# Patient Record
Sex: FEMALE
[De-identification: names, ages, dates, MRNs, and addresses within clinical notes are randomized per-mention and may not be internally consistent; named-entity substitution may affect disease eponyms.]

---

## 2021-02-01 ENCOUNTER — NURSE TRIAGE (OUTPATIENT)
Dept: OTHER | Facility: CLINIC | Age: 76
End: 2021-02-01

## 2021-02-01 NOTE — TELEPHONE ENCOUNTER
Reason for Disposition   [1] MILD swelling (puffiness) of both hands AND [2] new onset or worsening  (Exception: caused by hot weather or normal pregnancy swelling)    Answer Assessment - Initial Assessment Questions  1. ONSET: \"When did the swelling start? \" (e.g., minutes, hours, days)      About 15 min ago    2. LOCATION: \"What part of the hand is swollen? \"  \"Are both hands swollen or just one hand? \"      Pt states that her right index finger is swollen    3. SEVERITY: \"How bad is the swelling? \" (e.g., localized; mild, moderate, severe)    - BALL OR LUMP: small ball or lump    - LOCALIZED: puffy or swollen area or patch of skin    - JOINT SWELLING: swelling of a joint    - MILD: puffiness or mild swelling of fingers or hand    - MODERATE: fingers and hand are swollen    - SEVERE: swelling of entire hand and up into forearm      Mild     4. REDNESS: \"Does the swelling look red or infected? \"      Denies redness    5. PAIN: \"Is the swelling painful to touch? \" If so, ask: \"How painful is it? \"   (Scale 1-10; mild, moderate or severe)      Denies pain    6. FEVER: \"Do you have a fever? \" If so, ask: \"What is it, how was it measured, and when did it start? \"       Denies fever    7. CAUSE: \"What do you think is causing the hand swelling? \" (e.g., heat, insect bite, pregnancy, recent injury)      Pt is not sure. Pt states that she did clear snow off of her car earlier today. Pt thinks she could also possibly be allergic to her new deodorant. 8. MEDICAL HISTORY: \"Do you have a history of heart failure, kidney disease, liver failure, or cancer? \"      Pt has chronic kidney disease, high blood pressure, arthritis     9. RECURRENT SYMPTOM: \"Have you had hand swelling before? \" If so, ask: \"When was the last time? \" \"What happened that time? \"      Denies     10. OTHER SYMPTOMS: \"Do you have any other symptoms? \" (e.g., blurred vision, difficulty breathing, headache)        Denies     11.  PREGNANCY: \"Is there any chance you are pregnant? \" \"When was your last menstrual period? \"        N/a    Protocols used: HAND SWELLING-ADULT-AH    Brief description of triage: Pt is calling with c/o right index index finger swelling. Pt denies injury. Triage indicates for patient to see a provider within the next 24 hours. Care advice provided, patient verbalizes understanding; denies any other questions or concerns; instructed to call back for any new or worsening symptoms. This triage is a result of a call to [address] [place] [address]. Please do not respond to the triage nurse through this encounter. Any subsequent communication should be directly with the patient.

## 2021-02-18 ENCOUNTER — NURSE TRIAGE (OUTPATIENT)
Dept: OTHER | Facility: CLINIC | Age: 76
End: 2021-02-18

## 2021-02-18 NOTE — TELEPHONE ENCOUNTER
Tongue swelling and had to call 911  Think that it was a rxn to losartan and now  changed to Amlodipine  Allergy added in Epic chart    Was in the hospital and was instructed to have a soft diet for a while but unsure what she can eat.     Information only as documented below    Foods to eat  Cream of wheat and cream of rice  Cooked white rice  Mashed potatoes  Plain pasta and noodles  White bread  Applesauce  Cooked or mashed vegetables without stems and seeds  Carrots  Cottage cheese  Yogurt   Smooth peanut butter  Eggs  Fish, turkey, chicken, or other meat that is not tough or stringy    Foods to avoid  Nuts and seeds  Chips   Popcorn, popcorn cakes, or rice cakes  Crackers with nuts, seeds, or spicy seasonings  Raw vegetables  Crunchy peanut butter    Fluids to avoid  Alcoholic beverages  Coffee and regular teas  Ethel and other drinks with caffeine  Cranberry, orange, pineapple, and grapefruit juice                  Reason for Disposition   Information only question and nurse able to answer    Protocols used: INFORMATION ONLY CALL - NO TRIAGE-ADULT-OH